# Patient Record
Sex: MALE | Race: WHITE | NOT HISPANIC OR LATINO
[De-identification: names, ages, dates, MRNs, and addresses within clinical notes are randomized per-mention and may not be internally consistent; named-entity substitution may affect disease eponyms.]

---

## 2021-03-12 ENCOUNTER — APPOINTMENT (OUTPATIENT)
Dept: RHEUMATOLOGY | Facility: CLINIC | Age: 46
End: 2021-03-12
Payer: COMMERCIAL

## 2021-03-12 ENCOUNTER — RESULT REVIEW (OUTPATIENT)
Age: 46
End: 2021-03-12

## 2021-03-12 VITALS
SYSTOLIC BLOOD PRESSURE: 130 MMHG | HEIGHT: 73 IN | HEART RATE: 64 BPM | WEIGHT: 260 LBS | BODY MASS INDEX: 34.46 KG/M2 | DIASTOLIC BLOOD PRESSURE: 80 MMHG

## 2021-03-12 DIAGNOSIS — Z86.79 PERSONAL HISTORY OF OTHER DISEASES OF THE CIRCULATORY SYSTEM: ICD-10-CM

## 2021-03-12 DIAGNOSIS — Z87.898 PERSONAL HISTORY OF OTHER SPECIFIED CONDITIONS: ICD-10-CM

## 2021-03-12 DIAGNOSIS — Z82.69 FAMILY HISTORY OF OTHER DISEASES OF THE MUSCULOSKELETAL SYSTEM AND CONNECTIVE TISSUE: ICD-10-CM

## 2021-03-12 LAB
BASOPHILS # BLD AUTO: 0.06 K/UL
BASOPHILS NFR BLD AUTO: 0.7 %
EOSINOPHIL # BLD AUTO: 0.11 K/UL
EOSINOPHIL NFR BLD AUTO: 1.3 %
HCT VFR BLD CALC: 50.9 %
HGB BLD-MCNC: 16.4 G/DL
IMM GRANULOCYTES NFR BLD AUTO: 0.5 %
LYMPHOCYTES # BLD AUTO: 2.29 K/UL
LYMPHOCYTES NFR BLD AUTO: 27.6 %
MAN DIFF?: NORMAL
MCHC RBC-ENTMCNC: 30.3 PG
MCHC RBC-ENTMCNC: 32.2 GM/DL
MCV RBC AUTO: 94.1 FL
MONOCYTES # BLD AUTO: 0.82 K/UL
MONOCYTES NFR BLD AUTO: 9.9 %
NEUTROPHILS # BLD AUTO: 4.99 K/UL
NEUTROPHILS NFR BLD AUTO: 60 %
PLATELET # BLD AUTO: 253 K/UL
RBC # BLD: 5.41 M/UL
RBC # FLD: 13.5 %
WBC # FLD AUTO: 8.31 K/UL

## 2021-03-12 PROCEDURE — 36415 COLL VENOUS BLD VENIPUNCTURE: CPT

## 2021-03-12 PROCEDURE — 99072 ADDL SUPL MATRL&STAF TM PHE: CPT

## 2021-03-12 PROCEDURE — 99205 OFFICE O/P NEW HI 60 MIN: CPT | Mod: 25

## 2021-03-12 RX ORDER — NEBIVOLOL HYDROCHLORIDE 10 MG/1
10 TABLET ORAL DAILY
Refills: 0 | Status: ACTIVE | COMMUNITY
Start: 2021-03-12

## 2021-03-12 RX ORDER — COLCHICINE 0.6 MG/1
0.6 TABLET ORAL TWICE DAILY
Qty: 60 | Refills: 2 | Status: ACTIVE | COMMUNITY
Start: 2021-03-12 | End: 1900-01-01

## 2021-03-12 NOTE — HISTORY OF PRESENT ILLNESS
[Fatigue] : fatigue [Joint Swelling] : joint swelling [FreeTextEntry1] : 45 year old male with PMHx as listed below presents with long-standing joint complaints:\par 2004 ; HTN ; started on Lotrel,within weeks, developed severe pain/swelling/redness in his big toe.  He was dx'ed w/ gout and started on colchicine.  Lotrel was stopped\par 2007 - several gout flares in both feet - started on colchicine and indomethacin and the flares would resolve\par 2012 - diffuse joint pain, including elbows, neck and back and fatigue.  PMD suspected Lyme Disease - testing was negative (?WB), though he was started on abx for 1 month and the symptoms improved.  \par 2014 - flares in feet, ankles, and knees - saw orthopedics who drained the left knee and suspected gout.  MRI also revealed a "slight tear" in the knee\par 2015 - BP out of control.  was started on several meds, but again developed joint pains, though the meds were adjusted until he was started on Bystolic on which he did not experience joint pains  However, he continued to experience gout flares, which would resolve w/ colchicine\par [id number] - occasional flares - "not too bad"\par 2018 - again began to experience diffuse joint pains.  He saw another rheumatologist, but w/u was reportedly non-diagnostic and he was lost to follow up.  He then stopped taking Bystolic and the joint pains seemed to improve.\par 2021 - BP again elevated - he was started on amlodipine and benazepril.  In 1/21, he experienced a severe flare in his left foot and left knee.  He saw orthopedics 2 weeks ago. who again drained the knee and referred him here.\par \par Pt also c/o AM stiffness, as well as stiffness after any prolonged inactivity, usually lasting at least 15 minutes.\par \par No F/C, no unintentional weight loss, no night sweats, no oral ulcers, no rashes, no joint pains, no alopecia, no photosensitivity, no dry eyes/dry mouth, no Raynaud symptoms, no focal weakness, no dysphagia\par No hx of uveitis or dactylitis, no IBD symptoms, no preceding GI/ infection\par \par  [Anorexia] : no anorexia [Weight Loss] : no weight loss [Malaise] : no malaise [Fever] : no fever [Chills] : no chills [Malar Facial Rash] : no malar facial rash [Skin Lesions] : no lesions [Skin Nodules] : no skin nodules [Oral Ulcers] : no oral ulcers [Cough] : no cough [Dry Mouth] : no dry mouth [Dysphonia] : no dysphonia [Dysphagia] : no dysphagia [Shortness of Breath] : no shortness of breath [Chest Pain] : no chest pain [Arthralgias] : no arthralgias [Joint Warmth] : no joint warmth [Joint Deformity] : no joint deformity [Decreased ROM] : no decreased range of motion [Morning Stiffness] : no morning stiffness [Falls] : no falls [Dyspnea] : no dyspnea [Myalgias] : no myalgias [Muscle Weakness] : no muscle weakness [Muscle Spasms] : no muscle spasms [Muscle Cramping] : no muscle cramping [Visual Changes] : no visual changes [Eye Pain] : no eye pain [Eye Redness] : no eye redness [Dry Eyes] : no dry eyes

## 2021-03-12 NOTE — CONSULT LETTER
[Dear  ___] : Dear  [unfilled], [Consult Letter:] : I had the pleasure of evaluating your patient, [unfilled]. [Please see my note below.] : Please see my note below. [Consult Closing:] : Thank you very much for allowing me to participate in the care of this patient.  If you have any questions, please do not hesitate to contact me. [Sincerely,] : Sincerely, [FreeTextEntry3] : Golden Bowden MD\par Rheumatology\par [facility]\par  of Medicine\par [facility] \par \par [address]\[place] [address]\par \par [address] [facility]\[place] [address]\par \par [address] [address]\par \par phone:  [phone]\par fax:      [phone]\par

## 2021-03-12 NOTE — ASSESSMENT
[FreeTextEntry1] : 45 year old male presents with long-standing joint complaints.  His acute flares are most c/w gout, especially given the MTP involvement and elevated uric acid, though would require an arthrocentesis for a definitive diagnosis.  I am starting him on allopurinol which I will plan to titrate to a goal of uric acid < 6.  We also discussed gout dietary recommendations.\par In addition, pt c/o more persistent joint pain and stiffness.  His presentation is most suggestive of psoriatic arthritis, especially given the presence of psoriatic-appearing lesions on his LE's and (+)HLA-B27 on prior labs.  I have ordered some more bloodwork and x-rays as further workup.  I will plan to likely start him on a biologic at his next visit (avoiding DMARD's as he plans to attempt to have more children).  In the meantime, he will continue taking ibuprofen prn to help alleviate his symptoms.

## 2021-03-12 NOTE — PHYSICAL EXAM
[General Appearance - Alert] : alert [General Appearance - In No Acute Distress] : in no acute distress [Sclera] : the sclera and conjunctiva were normal [Outer Ear] : the ears and nose were normal in appearance [Oropharynx] : the oropharynx was normal [Neck Appearance] : the appearance of the neck was normal [Neck Cervical Mass (___cm)] : no neck mass was observed [Jugular Venous Distention Increased] : there was no jugular-venous distention [Thyroid Diffuse Enlargement] : the thyroid was not enlarged [Thyroid Nodule] : there were no palpable thyroid nodules [Auscultation Breath Sounds / Voice Sounds] : lungs were clear to auscultation bilaterally [Heart Rate And Rhythm] : heart rate was normal and rhythm regular [Heart Sounds] : normal S1 and S2 [Heart Sounds Gallop] : no gallops [Murmurs] : no murmurs [Heart Sounds Pericardial Friction Rub] : no pericardial rub [Edema] : there was no peripheral edema [Bowel Sounds] : normal bowel sounds [Abdomen Soft] : soft [Abdomen Tenderness] : non-tender [] : no hepato-splenomegaly [Abdomen Mass (___ Cm)] : no abdominal mass palpated [Cervical Lymph Nodes Enlarged Posterior Bilaterally] : posterior cervical [Cervical Lymph Nodes Enlarged Anterior Bilaterally] : anterior cervical [Supraclavicular Lymph Nodes Enlarged Bilaterally] : supraclavicular [No Spinal Tenderness] : no spinal tenderness [Skin Color & Pigmentation] : normal skin color and pigmentation [Skin Turgor] : normal skin turgor [No Focal Deficits] : no focal deficits [Oriented To Time, Place, And Person] : oriented to person, place, and time [Impaired Insight] : insight and judgment were intact [Affect] : the affect was normal [FreeTextEntry1] : (+)psoriatic lesions on B/L LE's (R>L)

## 2021-03-12 NOTE — DATA REVIEWED
[FreeTextEntry1] : 12/20/20:\par Hgb 17.4;  rest of CBC unremarkable\par CMP unremarkable\par uric acid 6.8\par 10/22/19:  uric acid 8.2\par 5/2/17:\par (+)HLA-B27\par ALIX negative\par RF, CCP negative\par dsDNA, Sm, RNP SSA/SSB, Christi-i, SCL-70, centromere AB's - all negative\par uric acid 9.1

## 2021-03-13 ENCOUNTER — TRANSCRIPTION ENCOUNTER (OUTPATIENT)
Age: 46
End: 2021-03-13

## 2021-03-15 LAB
ALBUMIN SERPL ELPH-MCNC: 4.7 G/DL
ALP BLD-CCNC: 82 U/L
ALT SERPL-CCNC: 30 U/L
ANION GAP SERPL CALC-SCNC: 11 MMOL/L
AST SERPL-CCNC: 23 U/L
BILIRUB SERPL-MCNC: 0.3 MG/DL
BUN SERPL-MCNC: 11 MG/DL
CALCIUM SERPL-MCNC: 10.3 MG/DL
CHLORIDE SERPL-SCNC: 104 MMOL/L
CO2 SERPL-SCNC: 28 MMOL/L
CREAT SERPL-MCNC: 0.84 MG/DL
CRP SERPL-MCNC: 14 MG/L
ERYTHROCYTE [SEDIMENTATION RATE] IN BLOOD BY WESTERGREN METHOD: 31 MM/HR
GLUCOSE SERPL-MCNC: 80 MG/DL
HBV CORE IGG+IGM SER QL: NONREACTIVE
HBV SURFACE AB SER QL: NONREACTIVE
HBV SURFACE AG SER QL: NONREACTIVE
HCV AB SER QL: NONREACTIVE
HCV S/CO RATIO: 0.08 S/CO
M TB IFN-G BLD-IMP: NEGATIVE
POTASSIUM SERPL-SCNC: 4.6 MMOL/L
PROT SERPL-MCNC: 7.5 G/DL
QUANTIFERON TB PLUS MITOGEN MINUS NIL: 7.94 IU/ML
QUANTIFERON TB PLUS NIL: 0.02 IU/ML
QUANTIFERON TB PLUS TB1 MINUS NIL: 0.04 IU/ML
QUANTIFERON TB PLUS TB2 MINUS NIL: 0.02 IU/ML
SODIUM SERPL-SCNC: 144 MMOL/L
URATE SERPL-MCNC: 8.2 MG/DL

## 2021-03-29 ENCOUNTER — APPOINTMENT (OUTPATIENT)
Dept: RADIOLOGY | Facility: CLINIC | Age: 46
End: 2021-03-29
Payer: COMMERCIAL

## 2021-03-29 ENCOUNTER — OUTPATIENT (OUTPATIENT)
Dept: OUTPATIENT SERVICES | Facility: HOSPITAL | Age: 46
LOS: 1 days | End: 2021-03-29
Payer: COMMERCIAL

## 2021-03-29 DIAGNOSIS — M19.90 UNSPECIFIED OSTEOARTHRITIS, UNSPECIFIED SITE: ICD-10-CM

## 2021-03-29 PROCEDURE — 73080 X-RAY EXAM OF ELBOW: CPT | Mod: 26,RT

## 2021-03-29 PROCEDURE — 73030 X-RAY EXAM OF SHOULDER: CPT

## 2021-03-29 PROCEDURE — 72202 X-RAY EXAM SI JOINTS 3/> VWS: CPT | Mod: 26

## 2021-03-29 PROCEDURE — 73030 X-RAY EXAM OF SHOULDER: CPT | Mod: 26,LT,76

## 2021-03-29 PROCEDURE — 73030 X-RAY EXAM OF SHOULDER: CPT | Mod: 26,RT

## 2021-03-29 PROCEDURE — 73620 X-RAY EXAM OF FOOT: CPT

## 2021-03-29 PROCEDURE — 73620 X-RAY EXAM OF FOOT: CPT | Mod: 26,RT

## 2021-03-29 PROCEDURE — 72202 X-RAY EXAM SI JOINTS 3/> VWS: CPT

## 2021-03-29 PROCEDURE — 73080 X-RAY EXAM OF ELBOW: CPT | Mod: 26,LT,76

## 2021-03-29 PROCEDURE — 73080 X-RAY EXAM OF ELBOW: CPT

## 2021-03-29 PROCEDURE — 73562 X-RAY EXAM OF KNEE 3: CPT

## 2021-03-29 PROCEDURE — 73562 X-RAY EXAM OF KNEE 3: CPT | Mod: 26,LT,76

## 2021-03-29 PROCEDURE — 73562 X-RAY EXAM OF KNEE 3: CPT | Mod: 26,RT

## 2021-03-29 PROCEDURE — 73620 X-RAY EXAM OF FOOT: CPT | Mod: 26,LT,76

## 2021-04-12 ENCOUNTER — APPOINTMENT (OUTPATIENT)
Dept: RHEUMATOLOGY | Facility: CLINIC | Age: 46
End: 2021-04-12
Payer: COMMERCIAL

## 2021-04-12 VITALS
WEIGHT: 260 LBS | OXYGEN SATURATION: 97 % | BODY MASS INDEX: 34.46 KG/M2 | HEART RATE: 60 BPM | TEMPERATURE: 97.8 F | SYSTOLIC BLOOD PRESSURE: 154 MMHG | HEIGHT: 73 IN | DIASTOLIC BLOOD PRESSURE: 97 MMHG

## 2021-04-12 DIAGNOSIS — M10.09 IDIOPATHIC GOUT, MULTIPLE SITES: ICD-10-CM

## 2021-04-12 DIAGNOSIS — L40.9 PSORIASIS, UNSPECIFIED: ICD-10-CM

## 2021-04-12 DIAGNOSIS — Z79.899 OTHER LONG TERM (CURRENT) DRUG THERAPY: ICD-10-CM

## 2021-04-12 DIAGNOSIS — M19.90 UNSPECIFIED OSTEOARTHRITIS, UNSPECIFIED SITE: ICD-10-CM

## 2021-04-12 DIAGNOSIS — Z15.89 GENETIC SUSCEPTIBILITY TO OTHER DISEASE: ICD-10-CM

## 2021-04-12 PROCEDURE — 99214 OFFICE O/P EST MOD 30 MIN: CPT

## 2021-04-12 PROCEDURE — 99072 ADDL SUPL MATRL&STAF TM PHE: CPT

## 2021-04-12 NOTE — ASSESSMENT
[FreeTextEntry1] : 45 year old male with:\par 1)  Gout:  recently started on allopurinol\par   - Check uric acid.\par   - Will plan to titrate allopurinol dose to uric acid < 6.\par   - Cont colchicine 0.6mg daily.\par 2)  Psoriasis:  did not respond to topicals\par   - Will review literature re: Otezla and male fertility. (avoiding DMARD's as he plans to attempt to have more children)\par 3)  Polyarticular arthritis:  currently improved.  ?due to chronic gout vs OA vs psoriatic arthritis (pt w/ (+)HLA-B27).\par   - Reiterated importance of exercise\par   - ibuprofen and/or Tylenol prn\par   - wt loss\par   - warm compresses\par   - OTC topical analgesics\par .

## 2021-04-12 NOTE — HISTORY OF PRESENT ILLNESS
[Fatigue] : fatigue [Joint Swelling] : joint swelling [FreeTextEntry1] : Pt had been feeling better  - joint pains had improved, but over the past weekend, he ate shellfish and drank some beer, and now c/o "achiness" in his joints, primarily in the feet.  No swelling/erythema.  He also experienced an episode of numbness and shooting pains in his right heel, though now resolved. [Anorexia] : no anorexia [Weight Loss] : no weight loss [Malaise] : no malaise [Fever] : no fever [Chills] : no chills [Malar Facial Rash] : no malar facial rash [Skin Lesions] : no lesions [Skin Nodules] : no skin nodules [Oral Ulcers] : no oral ulcers [Cough] : no cough [Dry Mouth] : no dry mouth [Dysphonia] : no dysphonia [Dysphagia] : no dysphagia [Shortness of Breath] : no shortness of breath [Chest Pain] : no chest pain [Arthralgias] : no arthralgias [Joint Warmth] : no joint warmth [Joint Deformity] : no joint deformity [Decreased ROM] : no decreased range of motion [Morning Stiffness] : no morning stiffness [Falls] : no falls [Dyspnea] : no dyspnea [Myalgias] : no myalgias [Muscle Weakness] : no muscle weakness [Muscle Spasms] : no muscle spasms [Muscle Cramping] : no muscle cramping [Visual Changes] : no visual changes [Eye Pain] : no eye pain [Eye Redness] : no eye redness [Dry Eyes] : no dry eyes

## 2021-04-12 NOTE — PHYSICAL EXAM
[General Appearance - Alert] : alert [General Appearance - In No Acute Distress] : in no acute distress [Sclera] : the sclera and conjunctiva were normal [Outer Ear] : the ears and nose were normal in appearance [Oropharynx] : the oropharynx was normal [Neck Appearance] : the appearance of the neck was normal [Neck Cervical Mass (___cm)] : no neck mass was observed [Jugular Venous Distention Increased] : there was no jugular-venous distention [Thyroid Diffuse Enlargement] : the thyroid was not enlarged [Thyroid Nodule] : there were no palpable thyroid nodules [Heart Rate And Rhythm] : heart rate was normal and rhythm regular [Auscultation Breath Sounds / Voice Sounds] : lungs were clear to auscultation bilaterally [Heart Sounds] : normal S1 and S2 [Heart Sounds Gallop] : no gallops [Murmurs] : no murmurs [Heart Sounds Pericardial Friction Rub] : no pericardial rub [Edema] : there was no peripheral edema [Bowel Sounds] : normal bowel sounds [Abdomen Soft] : soft [Abdomen Tenderness] : non-tender [] : no hepato-splenomegaly [Abdomen Mass (___ Cm)] : no abdominal mass palpated [Cervical Lymph Nodes Enlarged Posterior Bilaterally] : posterior cervical [Cervical Lymph Nodes Enlarged Anterior Bilaterally] : anterior cervical [Supraclavicular Lymph Nodes Enlarged Bilaterally] : supraclavicular [No Spinal Tenderness] : no spinal tenderness [Skin Color & Pigmentation] : normal skin color and pigmentation [Skin Turgor] : normal skin turgor [No Focal Deficits] : no focal deficits [Oriented To Time, Place, And Person] : oriented to person, place, and time [Impaired Insight] : insight and judgment were intact [Affect] : the affect was normal [FreeTextEntry1] : (+)psoriatic lesions on RLE

## 2021-04-13 LAB
ALBUMIN SERPL ELPH-MCNC: 4.7 G/DL
ALP BLD-CCNC: 88 U/L
ALT SERPL-CCNC: 40 U/L
ANION GAP SERPL CALC-SCNC: 11 MMOL/L
AST SERPL-CCNC: 27 U/L
BASOPHILS # BLD AUTO: 0.07 K/UL
BASOPHILS NFR BLD AUTO: 0.7 %
BILIRUB SERPL-MCNC: 0.2 MG/DL
BUN SERPL-MCNC: 15 MG/DL
CALCIUM SERPL-MCNC: 10 MG/DL
CHLORIDE SERPL-SCNC: 103 MMOL/L
CO2 SERPL-SCNC: 28 MMOL/L
CREAT SERPL-MCNC: 0.91 MG/DL
CRP SERPL-MCNC: 4 MG/L
EOSINOPHIL # BLD AUTO: 0.12 K/UL
EOSINOPHIL NFR BLD AUTO: 1.3 %
ERYTHROCYTE [SEDIMENTATION RATE] IN BLOOD BY WESTERGREN METHOD: 15 MM/HR
GLUCOSE SERPL-MCNC: 82 MG/DL
HCT VFR BLD CALC: 51.1 %
HGB BLD-MCNC: 16.6 G/DL
IMM GRANULOCYTES NFR BLD AUTO: 0.3 %
LYMPHOCYTES # BLD AUTO: 2.71 K/UL
LYMPHOCYTES NFR BLD AUTO: 28.5 %
MAN DIFF?: NORMAL
MCHC RBC-ENTMCNC: 30.5 PG
MCHC RBC-ENTMCNC: 32.5 GM/DL
MCV RBC AUTO: 93.8 FL
MONOCYTES # BLD AUTO: 1.11 K/UL
MONOCYTES NFR BLD AUTO: 11.7 %
NEUTROPHILS # BLD AUTO: 5.46 K/UL
NEUTROPHILS NFR BLD AUTO: 57.5 %
PLATELET # BLD AUTO: 220 K/UL
POTASSIUM SERPL-SCNC: 4.4 MMOL/L
PROT SERPL-MCNC: 7.8 G/DL
RBC # BLD: 5.45 M/UL
RBC # FLD: 13.5 %
SODIUM SERPL-SCNC: 142 MMOL/L
URATE SERPL-MCNC: 8 MG/DL
WBC # FLD AUTO: 9.5 K/UL

## 2021-04-14 ENCOUNTER — NON-APPOINTMENT (OUTPATIENT)
Age: 46
End: 2021-04-14

## 2021-04-19 ENCOUNTER — TRANSCRIPTION ENCOUNTER (OUTPATIENT)
Age: 46
End: 2021-04-19

## 2021-05-24 ENCOUNTER — APPOINTMENT (OUTPATIENT)
Dept: RHEUMATOLOGY | Facility: CLINIC | Age: 46
End: 2021-05-24

## 2021-10-05 ENCOUNTER — RX RENEWAL (OUTPATIENT)
Age: 46
End: 2021-10-05

## 2021-10-05 RX ORDER — ALLOPURINOL 100 MG/1
100 TABLET ORAL DAILY
Qty: 180 | Refills: 0 | Status: ACTIVE | COMMUNITY
Start: 2021-03-12 | End: 1900-01-01

## 2023-12-04 ENCOUNTER — NON-APPOINTMENT (OUTPATIENT)
Age: 48
End: 2023-12-04

## 2025-01-20 ENCOUNTER — APPOINTMENT (OUTPATIENT)
Dept: PULMONOLOGY | Facility: CLINIC | Age: 50
End: 2025-01-20
Payer: COMMERCIAL

## 2025-01-20 DIAGNOSIS — G47.33 OBSTRUCTIVE SLEEP APNEA (ADULT) (PEDIATRIC): ICD-10-CM

## 2025-01-20 DIAGNOSIS — Z86.39 PERSONAL HISTORY OF OTHER ENDOCRINE, NUTRITIONAL AND METABOLIC DISEASE: ICD-10-CM

## 2025-01-20 PROCEDURE — 99214 OFFICE O/P EST MOD 30 MIN: CPT | Mod: 95

## 2025-01-21 ENCOUNTER — NON-APPOINTMENT (OUTPATIENT)
Age: 50
End: 2025-01-21

## 2025-02-24 ENCOUNTER — NON-APPOINTMENT (OUTPATIENT)
Age: 50
End: 2025-02-24